# Patient Record
(demographics unavailable — no encounter records)

---

## 2017-06-13 NOTE — CAT SCAN REPORT
CT SCAN OF THE CERVICAL SPINE:



HISTORY:  Neck injury.



TECHNIQUE: Contiguous 1.25 mm axial images of the cervical spine were

obtained.  Sagittal and coronal reformatted images.



FINDINGS:

There is reversal of the normal cervical lordosis. The body,

pedicles and posterior ligaments appear normal.  No evidence of

fracture or subluxation is seen.  The spinal canal appears normal. The 

prevertebral soft tissues appear normal.



IMPRESSION:

Unremarkable CT of the cervical spine.  No acute process is noted.

## 2017-06-13 NOTE — XRAY REPORT
LEFT KNEE, 2 views:



History: Left knee pain.



The bony architecture is intact without evidence of fracture or

dislocation.  No significant soft tissue abnormality is seen.



IMPRESSION:

Normal left knee.
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
